# Patient Record
Sex: FEMALE | Race: OTHER
[De-identification: names, ages, dates, MRNs, and addresses within clinical notes are randomized per-mention and may not be internally consistent; named-entity substitution may affect disease eponyms.]

---

## 2022-04-03 ENCOUNTER — APPOINTMENT (OUTPATIENT)
Dept: AFTER HOURS CARE | Facility: EMERGENCY ROOM | Age: 52
End: 2022-04-03
Payer: COMMERCIAL

## 2022-04-03 DIAGNOSIS — R39.9 UNSPECIFIED SYMPTOMS AND SIGNS INVOLVING THE GENITOURINARY SYSTEM: ICD-10-CM

## 2022-04-03 PROCEDURE — 99204 OFFICE O/P NEW MOD 45 MIN: CPT | Mod: 95

## 2022-04-29 PROBLEM — R39.9 UTI SYMPTOMS: Status: ACTIVE | Noted: 2022-04-29

## 2022-04-29 PROBLEM — Z00.00 ENCOUNTER FOR PREVENTIVE HEALTH EXAMINATION: Status: ACTIVE | Noted: 2022-04-29

## 2022-04-29 NOTE — ASSESSMENT
[FreeTextEntry1] : Pt w/ aforementioned presentation concerning for UTI with no abd pain or flank pain but w/ low grade temp concerning for development of early pyelonephritis.

## 2022-04-29 NOTE — PHYSICAL EXAM
[de-identified] : General: pt appears stated age and is in nad, speaking in full clear sentences\par HEENT: AT/NC, pink conjunctiva, anicteric sclerae, EOMI, mmm\par Neck: full ROM, trachea midline\par Lungs: no respiratory distress\par Neuro: awake, alert, responsive; oriented to person, place and time; cranial nerves grossly intact, EOMI intact jaw movement, no facial asymmetry, hearing intact\par

## 2022-04-29 NOTE — PLAN
[With new medications prescribed] : Treat in place: with new medications prescribed [FreeTextEntry1] : 1)	Prescription for keflex sent to [facility] in [place] that is open now\par 2)	Pt instructed to take ibuprofen 600mg now and then q6h\par 3)	Pt counseled on warning signs and symptoms of pyelonephritis\par 4)	Pt encouraged to follow up with her regular doctor.\par

## 2022-04-29 NOTE — HISTORY OF PRESENT ILLNESS
[Home] : at home, [unfilled] , at the time of the visit. [Other Location: e.g. Home (Enter Location, City,State)___] : at [unfilled] [Verbal consent obtained from patient] : the patient, [unfilled] [FreeTextEntry8] : 51 yo f pain on urination started midday today. Trace blood in toilet. Temp 99.6F took Tylenol 1000mg 1.5 hours ago. Then 100.2F. pain and blood went away. No abd pain. Usual low back pain from golf and no flank pain. No n/v/d. no pmh . nkda.

## 2025-01-07 ENCOUNTER — TRANSCRIPTION ENCOUNTER (OUTPATIENT)
Age: 55
End: 2025-01-07